# Patient Record
Sex: MALE | Race: OTHER | Employment: OTHER | URBAN - METROPOLITAN AREA
[De-identification: names, ages, dates, MRNs, and addresses within clinical notes are randomized per-mention and may not be internally consistent; named-entity substitution may affect disease eponyms.]

---

## 2018-03-28 ENCOUNTER — OFFICE VISIT (OUTPATIENT)
Dept: CARDIOLOGY CLINIC | Facility: CLINIC | Age: 58
End: 2018-03-28
Payer: MEDICARE

## 2018-03-28 VITALS
HEART RATE: 63 BPM | SYSTOLIC BLOOD PRESSURE: 112 MMHG | HEIGHT: 68 IN | DIASTOLIC BLOOD PRESSURE: 58 MMHG | WEIGHT: 158 LBS | OXYGEN SATURATION: 92 % | BODY MASS INDEX: 23.95 KG/M2

## 2018-03-28 DIAGNOSIS — F73 PROFOUND INTELLECTUAL DISABILITY IN ADULT: ICD-10-CM

## 2018-03-28 DIAGNOSIS — K21.9 GASTROESOPHAGEAL REFLUX DISEASE WITHOUT ESOPHAGITIS: ICD-10-CM

## 2018-03-28 DIAGNOSIS — I35.1 AORTIC VALVE INSUFFICIENCY, ETIOLOGY OF CARDIAC VALVE DISEASE UNSPECIFIED: ICD-10-CM

## 2018-03-28 DIAGNOSIS — R94.31 ABNORMAL EKG: Primary | ICD-10-CM

## 2018-03-28 PROCEDURE — 93000 ELECTROCARDIOGRAM COMPLETE: CPT | Performed by: INTERNAL MEDICINE

## 2018-03-28 PROCEDURE — 99204 OFFICE O/P NEW MOD 45 MIN: CPT | Performed by: INTERNAL MEDICINE

## 2018-03-28 RX ORDER — ACETIC ACID 20.65 MG/ML
SOLUTION AURICULAR (OTIC)
COMMUNITY
Start: 2018-01-17 | End: 2020-03-12

## 2018-03-28 RX ORDER — AMLODIPINE BESYLATE 5 MG/1
1 TABLET ORAL EVERY MORNING
COMMUNITY
Start: 2018-02-28

## 2018-03-28 RX ORDER — OSELTAMIVIR PHOSPHATE 75 MG/1
CAPSULE ORAL
COMMUNITY
Start: 2018-02-07 | End: 2018-03-28

## 2018-03-28 RX ORDER — OMEPRAZOLE 20 MG/1
1 CAPSULE, DELAYED RELEASE ORAL
COMMUNITY
Start: 2018-02-24

## 2018-03-28 RX ORDER — LAMOTRIGINE 150 MG/1
1 TABLET ORAL 2 TIMES DAILY
COMMUNITY
Start: 2018-03-17

## 2018-03-28 NOTE — PATIENT INSTRUCTIONS
1   Obtain echocardiogram, transthoracic, for assessment of aortic regurgitation, LV size and systolic function  2   Forward that report to me, and if possible, a CD or DVD of the actual images  3   Following receipt of that information, I will have further recommendations

## 2018-03-28 NOTE — PROGRESS NOTES
Consultation - Cardiology   Radha Walters 56 y o  male MRN: [id number]  Unit/Bed#:  Encounter: [id number]        Assessment/Plan     Assessment:    1  Asymptomatic hemodynamically significant aortic regurgitation, based on physical examination, which suggests left ventricular enlargement with prominent S4 gallop and prominent murmur of aortic regurgitation  2   His EKG voltage criteria for LVH are also consistent with underlying significant aortic regurgitation  3   Profound intellectual disability  4  Hiatal hernia with GERD  5  Osteoarthritis of right hip  6  History of chronic constipation, seizure disorder, hemorrhoids, osteopenia  Plan:    1  Obtain echocardiogram, transthoracic, for assessment of aortic regurgitation, LV size and systolic function  2   Forward that report to me, and if possible, a CD or DVD of the actual images  3   Following receipt of that information, I will have further recommendations  History of Present Illness   Physician Requesting Consult: Dr Holli Rice    Reason for Consult / Principal Problem: Aortic regurgitation and abnormal EKG    HPI: Radha Walters is a 56y o  year old male who presents with no recent cardiopulmonary symptoms and history of aortic regurgitation  EKG today and on 01/19/2018 both showed LVH voltage with LAE  Patient also exhibits a 1st degree AV block  I could not locate old records regarding prior test results  There is mention in [facility] records of echocardiogram on 09/23/2013 showing moderate to severe aortic regurgitation with 50-60% ejection fraction, unchanged from 01/18/2012  Progressive LV enlargement was mentioned on 09/23/2013  He was then lost to cardiology follow-up until today  Historical Information   Past Medical History:   Diagnosis Date    Aortic regurgitation     GERD (gastroesophageal reflux disease)     Osteoarthritis of right hip      History reviewed  No pertinent surgical history    History   Alcohol Use No History   Drug use: Unknown     History   Smoking Status    Never Smoker   Smokeless Tobacco    Never Used     History reviewed  No pertinent family history  Meds/Allergies   Prior to Admission medications    Medication Sig Start Date End Date Taking? Authorizing Provider   acetic acid (VOSOL) 2 % otic solution  1/17/18  Yes Historical Provider, MD   amLODIPine (NORVASC) 5 mg tablet Take 1 tablet by mouth every morning 2/28/18  Yes Historical Provider, MD   lamoTRIgine (LaMICtal) 150 MG tablet Take 1 tablet by mouth 2 (two) times a day 3/17/18  Yes Historical Provider, MD   Melatonin 1 MG CAPS Take 3 mg by mouth   Yes Historical Provider, MD   omeprazole (PriLOSEC) 20 mg delayed release capsule Take 1 capsule by mouth daily in the early morning 2/24/18  Yes Historical Provider, MD   oseltamivir (TAMIFLU) 75 mg capsule  2/7/18 3/28/18  Historical Provider, MD     Current Outpatient Prescriptions   Medication Sig Dispense Refill    acetic acid (VOSOL) 2 % otic solution       amLODIPine (NORVASC) 5 mg tablet Take 1 tablet by mouth every morning      lamoTRIgine (LaMICtal) 150 MG tablet Take 1 tablet by mouth 2 (two) times a day      Melatonin 1 MG CAPS Take 3 mg by mouth      omeprazole (PriLOSEC) 20 mg delayed release capsule Take 1 capsule by mouth daily in the early morning       No current facility-administered medications for this visit  No Known Allergies    Cardiovascular ROS:   Unavailable owing to nonverbal status of patient  More than 10 systems reviewed and all systems negative, except as noted in history of present illness    Objective   Vitals: Blood pressure 112/58, pulse 63, height 5' 8" (1 727 m), weight 71 7 kg (158 lb), SpO2 92 %  Body mass index is 24 02 kg/m²  Physical Exam  General -well-developed well-nourished   male  in no acute distress  Patient is alert, oriented X3 and cooperative    Skin-warm and dry with no pallor, rashes, ecchymoses, lesions  HEENT-normocephalic  Pupils equally round and reactive to light and accommodation  Extraocular muscles intact  Mucous membranes pink and moist  Sclerae nonicteric  Optic fundi poorly seen  Neck-no jugular venous distention, AJR, masses, thyromegaly, adenopathy, bruits  Lungs-no rales, rhonchi, wheezes  Heart-there is cardiomegaly with palpable and audible S4 gallop with grade 2/6 decrescendo blowing murmur heard along left sternal border and radiating to cardiac apex, with S4 gallop, but no rub, click, heave, thrill  The apex impulse is diffuse and displaced to the left  Abdomen-normal bowel sounds with no masses, organomegaly, guarding, tenderness, or rigidity  Extremities-no cyanosis, clubbing, edema, pulse decrease  Neurological-no focal neurological signs  No results found for this or any previous visit (from the past 24 hour(s))  EKG 03/28/2018:     Voltage criteria for LVH  First degree AV block  LAE  Poor R-wave progression  Similar to 01/19/2018 and 05/20/2014    Imaging  Chest X-Ray:  No Chest XR results available for this patient  Lab Review     Outside lab 12/04/2017:    Normal CMP, CBC    No results found for: CHOL  No results found for: HDL  No results found for: LDLCALC  No results found for: TRIG  No components found for: CHOLHDL    No results found for: GLUCOSE, CALCIUM, NA, K, CO2, CL, BUN, CREATININE    Counseling / Coordination of Care  Total floor / unit time spent today  minutes       Phong Beltran MD

## 2018-04-09 ENCOUNTER — TRANSCRIBE ORDERS (OUTPATIENT)
Dept: ADMINISTRATIVE | Facility: HOSPITAL | Age: 58
End: 2018-04-09

## 2018-04-09 DIAGNOSIS — I42.1 IHSS (IDIOPATHIC HYPERTROPHIC SUBAORTIC STENOSIS) (HCC): Primary | ICD-10-CM

## 2018-04-13 ENCOUNTER — HOSPITAL ENCOUNTER (OUTPATIENT)
Dept: RADIOLOGY | Facility: HOSPITAL | Age: 58
Discharge: HOME | End: 2018-04-13
Payer: MEDICARE

## 2018-04-13 DIAGNOSIS — I42.1 IHSS (IDIOPATHIC HYPERTROPHIC SUBAORTIC STENOSIS) (HCC): ICD-10-CM

## 2018-04-13 PROCEDURE — 71250 CT THORAX DX C-: CPT

## 2018-05-18 ENCOUNTER — OFFICE VISIT (OUTPATIENT)
Dept: CARDIOLOGY CLINIC | Facility: CLINIC | Age: 58
End: 2018-05-18
Payer: MEDICARE

## 2018-05-18 VITALS — SYSTOLIC BLOOD PRESSURE: 110 MMHG | DIASTOLIC BLOOD PRESSURE: 54 MMHG | OXYGEN SATURATION: 93 % | HEART RATE: 81 BPM

## 2018-05-18 DIAGNOSIS — M16.11 PRIMARY OSTEOARTHRITIS OF RIGHT HIP: ICD-10-CM

## 2018-05-18 DIAGNOSIS — I35.1 SEVERE AORTIC REGURGITATION: Primary | ICD-10-CM

## 2018-05-18 DIAGNOSIS — G40.909 SEIZURE DISORDER (HCC): ICD-10-CM

## 2018-05-18 DIAGNOSIS — K21.9 GASTROESOPHAGEAL REFLUX DISEASE WITHOUT ESOPHAGITIS: ICD-10-CM

## 2018-05-18 DIAGNOSIS — M85.88 OSTEOPENIA OF SPINE: ICD-10-CM

## 2018-05-18 DIAGNOSIS — F73 PROFOUND INTELLECTUAL DISABILITY IN ADULT: ICD-10-CM

## 2018-05-18 PROCEDURE — 99214 OFFICE O/P EST MOD 30 MIN: CPT | Performed by: INTERNAL MEDICINE

## 2018-05-18 RX ORDER — SENNA PLUS 8.6 MG/1
1 TABLET ORAL DAILY
COMMUNITY

## 2018-05-18 RX ORDER — DOCUSATE SODIUM 250 MG
250 CAPSULE ORAL 2 TIMES DAILY
COMMUNITY

## 2018-05-18 RX ORDER — DIPHENOXYLATE HYDROCHLORIDE AND ATROPINE SULFATE 2.5; .025 MG/1; MG/1
1 TABLET ORAL DAILY
COMMUNITY

## 2018-05-18 NOTE — PATIENT INSTRUCTIONS
1   Based on echocardiogram from 04/02/2018, patient might be candidate for aortic valve replacement, in view of left ventricular enlargement, low-normal ejection fraction, and presence of moderate to severe aortic regurgitation  2   My suggestion is to refer the patient to a cardiothoracic surgeon for an opinion regarding his operability, perhaps using a minimally invasive technique  There has also been some experimental work done with TAVR in the setting of severe aortic regurgitation  3   If something more can be done, his medical power of  would need to be involved in such discussion  4   His prognosis is guarded with continued medical therapy with likely development progressive heart failure in the future

## 2018-05-18 NOTE — PROGRESS NOTES
Cardiology Progress Note    ASSESSMENT:    1  Asymptomatic hemodynamically significant aortic regurgitation, based on physical examination and recent echocardiogram, which demonstrated  left ventricular enlargement with prominent S4 gallop and prominent murmur of aortic regurgitation, left ventricular diastolic dysfunction, and low normal LVEF of 51% (TTE of 04/02/2018)  2   His EKG voltage criteria for LVH are also consistent with underlying significant aortic regurgitation  3   Profound intellectual disability  4  Hiatal hernia with GERD  5  Osteoarthritis of right hip  6  History of chronic constipation, seizure disorder, hemorrhoids, osteopenia  Plan       Patient Instructions     1  Based on echocardiogram from 04/02/2018, patient might be candidate for aortic valve replacement, in view of left ventricular enlargement, low-normal ejection fraction, and presence of moderate to severe aortic regurgitation  2   My suggestion is to refer the patient to a cardiothoracic surgeon for an opinion regarding his operability, perhaps using a minimally invasive technique  There has also been some experimental work done with TAVR in the setting of severe aortic regurgitation  3   If something more can be done, his medical power of  would need to be involved in such discussion  4   His prognosis is guarded with continued medical therapy with likely development progressive heart failure in the future  HPI    This 66 y o  male has not demonstrated any new cardiopulmonary and medical symptoms, according to his caregiver  He does a limited amount of assisted ambulation  The patient's recent transthoracic echocardiogram on 04/02/2018 confirmed the a presence of hemodynamically significant moderate to severe aortic regurgitation causing left ventricular dilatation, grade 1 diastolic dysfunction, and reduced left ventricular systolic function with estimated ejection fraction of 51%      A recent CT scan of his chest on 04/13/2018 with did not provide any additional pertinent cardiac information  Review of Systems    All other systems negative, except as noted in history of present illness    Historical Information   Past Medical History:   Diagnosis Date    Aortic regurgitation     GERD (gastroesophageal reflux disease)     Osteoarthritis of right hip      History reviewed  No pertinent surgical history  History   Alcohol Use No     History   Drug use: Unknown     History   Smoking Status    Never Smoker   Smokeless Tobacco    Never Used       Family History:  History reviewed  No pertinent family history  Meds/Allergies     Prior to Admission medications    Medication Sig Start Date End Date Taking?  Authorizing Provider   amLODIPine (NORVASC) 5 mg tablet Take 1 tablet by mouth every morning 2/28/18  Yes Historical Provider, MD   bisacodyl (DULCOLAX) 5 mg EC tablet Take 5 mg by mouth daily   Yes Historical Provider, MD   calcium carbonate-vitamin D (OSCAL-D) 500 mg-200 units per tablet Take 1 tablet by mouth 2 (two) times a day with meals   Yes Historical Provider, MD   docusate sodium (STOOL SOFTENER) 250 MG capsule Take 250 mg by mouth 2 (two) times a day   Yes Historical Provider, MD   lamoTRIgine (LaMICtal) 150 MG tablet Take 1 tablet by mouth 2 (two) times a day 3/17/18  Yes Historical Provider, MD   Melatonin 1 MG CAPS Take 3 mg by mouth   Yes Historical Provider, MD   multivitamin (THERAGRAN) TABS Take 1 tablet by mouth daily   Yes Historical Provider, MD   omeprazole (PriLOSEC) 20 mg delayed release capsule Take 1 capsule by mouth daily in the early morning 2/24/18  Yes Historical Provider, MD   senna (SENOKOT) 8 6 MG tablet Take 1 tablet by mouth daily   Yes Historical Provider, MD   acetic acid (VOSOL) 2 % otic solution  1/17/18   Historical Provider, MD       No Known Allergies      Vitals:    05/18/18 1416   BP: 110/54   BP Location: Right arm   Patient Position: Sitting   Cuff Size: Standard   Pulse: 81   SpO2: 93%       There is no height or weight on file to calculate BMI  Physical Exam:    General Appearance:  Alert, cooperative, no distress, appears stated age   Head:  Normocephalic, without obvious abnormality, atraumatic   Eyes:  PERRL, conjunctiva/corneas clear, EOM's intact,   both eyes   Ears:  Normal TM's and external ear canals, both ears   Nose: Nares normal, septum midline, mucosa normal, no drainage or sinus tenderness   Throat: Lips, mucosa, and tongue normal; teeth and gums normal   Neck: Supple, symmetrical, trachea midline, no adenopathy, thyroid: not enlarged, symmetric, no tenderness/mass/nodules, no carotid bruit or JVD   Back:   Symmetric, no curvature, ROM normal, no CVA tenderness   Lungs:   Clear to auscultation bilaterally, respirations unlabored   Chest Wall:  No tenderness or deformity   Heart:  Regular rate and rhythm, S1, S2 normal, grade 2/6 diastolic decrescendo blowing murmur, more prominent at left than right sternal border with radiation to apex with S4 gallop and no rub or click and no S3  Abdomen:   Soft, non-tender, bowel sounds active all four quadrants,  no masses, no organomegaly   Extremities: Extremities normal, atraumatic, no cyanosis or edema   Pulses:  Skin:   Lymph nodes:   Neurologic:      2+ and symmetric  Skin showed normal color, texture, turgor and no rashes or lesions   Cervical, supraclavicular, and axillary nodes normal   Normal   No focal neurologic signs                       Cardiographics    ECG  :  Not applicable    Imaging      Transthoracic echocardiogram 04/02/2018: Moderate left ventricular dilatation with estimated LVEF of 51% and septal dyskinesis  Mild MAC with 2+ MR  Moderate to severe aortic regurgitation  1+ TR  Trace to 1+ PI  Mild aortic root dilatation  Grade 1 diastolic dysfunction    Chest X-Ray :  No Chest XR results available for this patient      CT of chest without contrast 04/13/2018:    Mild cardiomegaly  Mild dependent changes at lung bases    Lab Review       No results found for: NA, K, CL, CO2, ANIONGAP, BUN, CREATININE, GLUCOSE, GLUF, CALCIUM, CORRECTEDCA, AST, ALT, ALKPHOS, PROT, ALBUMIN, BILITOT, EGFR    No results found for: CHOL  No results found for: HDL  No results found for: LDLCALC  No results found for: TRIG  No components found for: CHOLHDL    No results found for: GLUCOSE, CALCIUM, NA, K, CO2, CL, BUN, CREATININE        Osvaldo Aguero MD

## 2020-05-16 LAB — EXT SARS-COV-2: NEGATIVE

## 2020-05-20 ENCOUNTER — OFFICE VISIT (OUTPATIENT)
Dept: CARDIOLOGY CLINIC | Facility: CLINIC | Age: 60
End: 2020-05-20
Payer: MEDICARE

## 2020-05-20 VITALS
DIASTOLIC BLOOD PRESSURE: 70 MMHG | OXYGEN SATURATION: 94 % | SYSTOLIC BLOOD PRESSURE: 130 MMHG | WEIGHT: 151 LBS | TEMPERATURE: 97 F | HEIGHT: 67 IN | HEART RATE: 65 BPM | BODY MASS INDEX: 23.7 KG/M2

## 2020-05-20 DIAGNOSIS — I35.1 NONRHEUMATIC AORTIC VALVE INSUFFICIENCY: ICD-10-CM

## 2020-05-20 DIAGNOSIS — I10 ESSENTIAL HYPERTENSION: ICD-10-CM

## 2020-05-20 DIAGNOSIS — F79 INTELLECTUAL DISABILITY: ICD-10-CM

## 2020-05-20 DIAGNOSIS — Z01.810 PRE-OPERATIVE CARDIOVASCULAR EXAMINATION: ICD-10-CM

## 2020-05-20 PROCEDURE — 99214 OFFICE O/P EST MOD 30 MIN: CPT | Performed by: INTERNAL MEDICINE

## 2020-05-20 RX ORDER — ZINC SULFATE 50(220)MG
220 CAPSULE ORAL DAILY
COMMUNITY

## 2020-05-20 RX ORDER — AMOXICILLIN 500 MG/1
500 CAPSULE ORAL EVERY 8 HOURS SCHEDULED
COMMUNITY

## 2020-05-20 RX ORDER — CHOLECALCIFEROL (VITAMIN D3) 125 MCG
2000 CAPSULE ORAL DAILY
COMMUNITY

## 2020-05-29 ENCOUNTER — TELEPHONE (OUTPATIENT)
Dept: CARDIOLOGY CLINIC | Facility: CLINIC | Age: 60
End: 2020-05-29

## 2020-06-03 ENCOUNTER — ANESTHESIA EVENT (OUTPATIENT)
Dept: PERIOP | Facility: HOSPITAL | Age: 60
End: 2020-06-03
Payer: COMMERCIAL

## 2020-06-04 ENCOUNTER — ANESTHESIA (OUTPATIENT)
Dept: PERIOP | Facility: HOSPITAL | Age: 60
End: 2020-06-04
Payer: COMMERCIAL

## 2020-06-04 ENCOUNTER — HOSPITAL ENCOUNTER (OUTPATIENT)
Facility: HOSPITAL | Age: 60
Setting detail: OUTPATIENT SURGERY
Discharge: NURSING, REHAB OR LONG-TERM CARE | End: 2020-06-04
Attending: DENTIST | Admitting: DENTIST
Payer: COMMERCIAL

## 2020-06-04 VITALS
HEART RATE: 66 BPM | RESPIRATION RATE: 18 BRPM | TEMPERATURE: 97.9 F | OXYGEN SATURATION: 100 % | DIASTOLIC BLOOD PRESSURE: 60 MMHG | SYSTOLIC BLOOD PRESSURE: 133 MMHG

## 2020-06-04 PROBLEM — K05.30 ADULT PERIODONTITIS: Chronic | Status: ACTIVE | Noted: 2020-06-04

## 2020-06-04 LAB — SARS-COV-2 RNA RESP QL NAA+PROBE: NEGATIVE

## 2020-06-04 PROCEDURE — 87635 SARS-COV-2 COVID-19 AMP PRB: CPT | Performed by: DENTIST

## 2020-06-04 RX ORDER — PROPOFOL 10 MG/ML
INJECTION, EMULSION INTRAVENOUS AS NEEDED
Status: DISCONTINUED | OUTPATIENT
Start: 2020-06-04 | End: 2020-06-04 | Stop reason: SURG

## 2020-06-04 RX ORDER — MAGNESIUM HYDROXIDE 1200 MG/15ML
LIQUID ORAL AS NEEDED
Status: DISCONTINUED | OUTPATIENT
Start: 2020-06-04 | End: 2020-06-04 | Stop reason: HOSPADM

## 2020-06-04 RX ORDER — SODIUM CHLORIDE, SODIUM LACTATE, POTASSIUM CHLORIDE, CALCIUM CHLORIDE 600; 310; 30; 20 MG/100ML; MG/100ML; MG/100ML; MG/100ML
INJECTION, SOLUTION INTRAVENOUS CONTINUOUS PRN
Status: DISCONTINUED | OUTPATIENT
Start: 2020-06-04 | End: 2020-06-04 | Stop reason: SURG

## 2020-06-04 RX ORDER — FENTANYL CITRATE 50 UG/ML
INJECTION, SOLUTION INTRAMUSCULAR; INTRAVENOUS AS NEEDED
Status: DISCONTINUED | OUTPATIENT
Start: 2020-06-04 | End: 2020-06-04 | Stop reason: SURG

## 2020-06-04 RX ORDER — METOCLOPRAMIDE HYDROCHLORIDE 5 MG/ML
10 INJECTION INTRAMUSCULAR; INTRAVENOUS ONCE AS NEEDED
Status: DISCONTINUED | OUTPATIENT
Start: 2020-06-04 | End: 2020-06-04 | Stop reason: HOSPADM

## 2020-06-04 RX ORDER — CLINDAMYCIN PHOSPHATE 600 MG/50ML
600 INJECTION INTRAVENOUS
Status: DISCONTINUED | OUTPATIENT
Start: 2020-06-04 | End: 2020-06-04 | Stop reason: HOSPADM

## 2020-06-04 RX ORDER — FENTANYL CITRATE/PF 50 MCG/ML
25 SYRINGE (ML) INJECTION
Status: DISCONTINUED | OUTPATIENT
Start: 2020-06-04 | End: 2020-06-04 | Stop reason: HOSPADM

## 2020-06-04 RX ORDER — SUCCINYLCHOLINE/SOD CL,ISO/PF 100 MG/5ML
SYRINGE (ML) INTRAVENOUS AS NEEDED
Status: DISCONTINUED | OUTPATIENT
Start: 2020-06-04 | End: 2020-06-04 | Stop reason: SURG

## 2020-06-04 RX ORDER — CHLORHEXIDINE GLUCONATE 0.12 MG/ML
RINSE ORAL AS NEEDED
Status: DISCONTINUED | OUTPATIENT
Start: 2020-06-04 | End: 2020-06-04 | Stop reason: HOSPADM

## 2020-06-04 RX ORDER — ONDANSETRON 2 MG/ML
4 INJECTION INTRAMUSCULAR; INTRAVENOUS ONCE AS NEEDED
Status: DISCONTINUED | OUTPATIENT
Start: 2020-06-04 | End: 2020-06-04 | Stop reason: HOSPADM

## 2020-06-04 RX ORDER — SODIUM CHLORIDE, SODIUM LACTATE, POTASSIUM CHLORIDE, CALCIUM CHLORIDE 600; 310; 30; 20 MG/100ML; MG/100ML; MG/100ML; MG/100ML
125 INJECTION, SOLUTION INTRAVENOUS CONTINUOUS
Status: DISCONTINUED | OUTPATIENT
Start: 2020-06-04 | End: 2020-06-04 | Stop reason: HOSPADM

## 2020-06-04 RX ORDER — EPHEDRINE SULFATE 50 MG/ML
INJECTION INTRAVENOUS AS NEEDED
Status: DISCONTINUED | OUTPATIENT
Start: 2020-06-04 | End: 2020-06-04 | Stop reason: SURG

## 2020-06-04 RX ORDER — SODIUM CHLORIDE, SODIUM LACTATE, POTASSIUM CHLORIDE, CALCIUM CHLORIDE 600; 310; 30; 20 MG/100ML; MG/100ML; MG/100ML; MG/100ML
100 INJECTION, SOLUTION INTRAVENOUS CONTINUOUS
Status: DISCONTINUED | OUTPATIENT
Start: 2020-06-04 | End: 2020-06-04 | Stop reason: HOSPADM

## 2020-06-04 RX ORDER — DEXAMETHASONE SODIUM PHOSPHATE 4 MG/ML
INJECTION, SOLUTION INTRA-ARTICULAR; INTRALESIONAL; INTRAMUSCULAR; INTRAVENOUS; SOFT TISSUE AS NEEDED
Status: DISCONTINUED | OUTPATIENT
Start: 2020-06-04 | End: 2020-06-04 | Stop reason: SURG

## 2020-06-04 RX ORDER — BUPIVACAINE HYDROCHLORIDE AND EPINEPHRINE 5; 5 MG/ML; UG/ML
INJECTION, SOLUTION PERINEURAL AS NEEDED
Status: DISCONTINUED | OUTPATIENT
Start: 2020-06-04 | End: 2020-06-04 | Stop reason: HOSPADM

## 2020-06-04 RX ORDER — LIDOCAINE HYDROCHLORIDE 10 MG/ML
INJECTION, SOLUTION EPIDURAL; INFILTRATION; INTRACAUDAL; PERINEURAL AS NEEDED
Status: DISCONTINUED | OUTPATIENT
Start: 2020-06-04 | End: 2020-06-04 | Stop reason: SURG

## 2020-06-04 RX ORDER — ONDANSETRON 2 MG/ML
INJECTION INTRAMUSCULAR; INTRAVENOUS AS NEEDED
Status: DISCONTINUED | OUTPATIENT
Start: 2020-06-04 | End: 2020-06-04 | Stop reason: SURG

## 2020-06-04 RX ADMIN — FENTANYL CITRATE 25 MCG: 50 INJECTION, SOLUTION INTRAMUSCULAR; INTRAVENOUS at 11:25

## 2020-06-04 RX ADMIN — Medication 100 MG: at 10:32

## 2020-06-04 RX ADMIN — LIDOCAINE HYDROCHLORIDE 50 MG: 10 INJECTION, SOLUTION EPIDURAL; INFILTRATION; INTRACAUDAL; PERINEURAL at 10:32

## 2020-06-04 RX ADMIN — DEXAMETHASONE SODIUM PHOSPHATE 4 MG: 4 INJECTION, SOLUTION INTRA-ARTICULAR; INTRALESIONAL; INTRAMUSCULAR; INTRAVENOUS; SOFT TISSUE at 10:32

## 2020-06-04 RX ADMIN — CLINDAMYCIN PHOSPHATE 600 MG: 600 INJECTION, SOLUTION INTRAVENOUS at 10:26

## 2020-06-04 RX ADMIN — PROPOFOL 150 MG: 10 INJECTION, EMULSION INTRAVENOUS at 10:32

## 2020-06-04 RX ADMIN — FENTANYL CITRATE 25 MCG: 50 INJECTION, SOLUTION INTRAMUSCULAR; INTRAVENOUS at 11:04

## 2020-06-04 RX ADMIN — EPHEDRINE SULFATE 5 MG: 50 INJECTION, SOLUTION INTRAVENOUS at 10:39

## 2020-06-04 RX ADMIN — ONDANSETRON 4 MG: 2 INJECTION INTRAMUSCULAR; INTRAVENOUS at 10:32

## 2020-06-04 RX ADMIN — SODIUM CHLORIDE, SODIUM LACTATE, POTASSIUM CHLORIDE, AND CALCIUM CHLORIDE: .6; .31; .03; .02 INJECTION, SOLUTION INTRAVENOUS at 10:24

## 2020-06-04 RX ADMIN — FENTANYL CITRATE 50 MCG: 50 INJECTION, SOLUTION INTRAMUSCULAR; INTRAVENOUS at 10:32

## 2020-06-04 RX ADMIN — PHENYLEPHRINE HYDROCHLORIDE 1 DROP: 1 SPRAY NASAL at 10:33

## 2020-06-11 ENCOUNTER — HOSPITAL ENCOUNTER (OUTPATIENT)
Dept: RADIOLOGY | Facility: HOSPITAL | Age: 60
Discharge: HOME | End: 2020-06-11
Attending: DENTIST

## 2020-06-11 DIAGNOSIS — Z01.20 ENCOUNTER FOR DENTAL EXAMINATION AND CLEANING WITHOUT ABNORMAL FINDINGS: ICD-10-CM

## (undated) DEVICE — SYRINGE BRIXTON AIRWATER SLEEVE CLEAR

## (undated) DEVICE — DISPOS-A BRUSH STANDARD

## (undated) DEVICE — ELECTRODE NEEDLE MEGAFINE E-Z CLEAN 2IN 45DEG -0119

## (undated) DEVICE — GLOVE SRG BIOGEL 8

## (undated) DEVICE — SPECIMEN SOCK - SHORT: Brand: MEDI-VAC

## (undated) DEVICE — DISPOS-A-BRUSH FINE BRISTLE

## (undated) DEVICE — DENTAL PACK: Brand: CARDINAL HEALTH

## (undated) DEVICE — PENCIL ELECTROSURG E-Z CLEAN -0035H

## (undated) DEVICE — ASTOUND STANDARD SURGICAL GOWN, XL: Brand: CONVERTORS

## (undated) DEVICE — MONOJECT NEEDLES 27 GA SHORT METAL HUB

## (undated) DEVICE — PROPHY ANGLE FIRM WHITE DISP LF

## (undated) DEVICE — ACCLEAN PROPHY PASTE COARSE: Brand: HENRY SCHEIN

## (undated) DEVICE — GLOVE INDICATOR PI UNDERGLOVE SZ 8.5 BLUE

## (undated) DEVICE — Device

## (undated) DEVICE — SUT VICRYL 3-0 PS-1 18 IN J683G